# Patient Record
Sex: MALE | Race: BLACK OR AFRICAN AMERICAN | NOT HISPANIC OR LATINO | URBAN - METROPOLITAN AREA
[De-identification: names, ages, dates, MRNs, and addresses within clinical notes are randomized per-mention and may not be internally consistent; named-entity substitution may affect disease eponyms.]

---

## 2019-09-28 ENCOUNTER — EMERGENCY (EMERGENCY)
Facility: HOSPITAL | Age: 24
LOS: 1 days | Discharge: HOME | End: 2019-09-28
Attending: EMERGENCY MEDICINE | Admitting: EMERGENCY MEDICINE
Payer: COMMERCIAL

## 2019-09-28 VITALS
TEMPERATURE: 98 F | RESPIRATION RATE: 16 BRPM | DIASTOLIC BLOOD PRESSURE: 67 MMHG | SYSTOLIC BLOOD PRESSURE: 102 MMHG | HEART RATE: 59 BPM | OXYGEN SATURATION: 99 %

## 2019-09-28 PROCEDURE — 99283 EMERGENCY DEPT VISIT LOW MDM: CPT

## 2019-09-28 RX ORDER — CIPROFLOXACIN AND DEXAMETHASONE 3; 1 MG/ML; MG/ML
4 SUSPENSION/ DROPS AURICULAR (OTIC) ONCE
Refills: 0 | Status: COMPLETED | OUTPATIENT
Start: 2019-09-28 | End: 2019-09-28

## 2019-09-28 RX ADMIN — CIPROFLOXACIN AND DEXAMETHASONE 4 DROP(S): 3; 1 SUSPENSION/ DROPS AURICULAR (OTIC) at 02:20

## 2019-09-28 RX ADMIN — Medication 1 TABLET(S): at 02:08

## 2019-09-28 NOTE — ED PROVIDER NOTE - CLINICAL SUMMARY MEDICAL DECISION MAKING FREE TEXT BOX
23 y/o M with otitis externa/media to right ear.  Will rx topical and oral abx, outpatient prn follow-up.

## 2019-09-28 NOTE — ED PROVIDER NOTE - RIGHT EAR
TM BULGING/TM EFFUSION/AURICULAR/TRAGAL TENDERNESS/DIMINISHED LIGHT REFLEX PRESENT/EXTERNAL CANAL INFLAMMATION/(+)abrasion to canal at 6 o'clock position with surrounding redness and erythema, no discharge

## 2019-09-28 NOTE — ED PROVIDER NOTE - NSFOLLOWUPCLINICS_GEN_ALL_ED_FT
[facility]  ENT  [address]  [place] [address]  Phone: [phone]  Fax:   Follow Up Time:

## 2019-09-28 NOTE — ED PROVIDER NOTE - OBJECTIVE STATEMENT
23 y/o healthy M with 5 days of decreased hearing from right ear.  Pt describes a pressure-like feeling and discomfort to right ear (described as feeling like he is underwater).  He reports he cleans his ear with q-tips routinely, but denies any other trauma.  He had noted some bleeding from the ear earlier and put hydrogen peroxide in the right ear.  No fever, chills, uri sxs, congestion, tinnitus, ear discharge.

## 2019-09-28 NOTE — ED PROVIDER NOTE - PATIENT PORTAL LINK FT
You can access the FollowMyHealth Patient Portal offered by [facility] by registering at the following website: [email or web address]. By joining [facility]’s FollowMyHealth portal, you will also be able to view your health information using other applications (apps) compatible with our system.

## 2019-09-28 NOTE — ED ADULT TRIAGE NOTE - CHIEF COMPLAINT QUOTE
Pt c/o right ear pain/unable to hear out of ear since Monday. Also reports slight bleeding from ear in the AM that has resolved. Denies dizziness/lightheadedness. Appears in NAD. Denies pmhx.

## 2019-09-28 NOTE — ED PROVIDER NOTE - NSFOLLOWUPINSTRUCTIONS_ED_ALL_ED_FT
You were seen in the emergency department for an ear infection (middle ear and external ear canal).  Do not put anything in your ear.  You were prescribed antibiotics, please take them all as prescribed - augmentin 1 tab twice a day for 7 days and ciprodex 4 drops in the right ear twice a day for 7 days.    Drink plenty of fluids.  You can take ibuprofen 600mg every 6 hours or Tylenol 650mg every 4 hours as needed for pain or fever.  Follow-up with your PMD as needed.  Return to the emergency department for any new or worsening symptoms.